# Patient Record
Sex: MALE | Race: WHITE | NOT HISPANIC OR LATINO | Employment: FULL TIME | URBAN - METROPOLITAN AREA
[De-identification: names, ages, dates, MRNs, and addresses within clinical notes are randomized per-mention and may not be internally consistent; named-entity substitution may affect disease eponyms.]

---

## 2019-12-21 ENCOUNTER — OFFICE VISIT (OUTPATIENT)
Dept: URGENT CARE | Facility: CLINIC | Age: 39
End: 2019-12-21
Payer: COMMERCIAL

## 2019-12-21 VITALS
SYSTOLIC BLOOD PRESSURE: 110 MMHG | WEIGHT: 310 LBS | HEART RATE: 80 BPM | BODY MASS INDEX: 41.08 KG/M2 | OXYGEN SATURATION: 98 % | TEMPERATURE: 98.7 F | HEIGHT: 73 IN | DIASTOLIC BLOOD PRESSURE: 80 MMHG | RESPIRATION RATE: 18 BRPM

## 2019-12-21 DIAGNOSIS — J02.9 SORE THROAT: Primary | ICD-10-CM

## 2019-12-21 LAB — S PYO AG THROAT QL: NEGATIVE

## 2019-12-21 PROCEDURE — 87880 STREP A ASSAY W/OPTIC: CPT | Performed by: PHYSICIAN ASSISTANT

## 2019-12-21 PROCEDURE — 87070 CULTURE OTHR SPECIMN AEROBIC: CPT | Performed by: PHYSICIAN ASSISTANT

## 2019-12-21 PROCEDURE — 99203 OFFICE O/P NEW LOW 30 MIN: CPT | Performed by: PHYSICIAN ASSISTANT

## 2019-12-21 NOTE — PATIENT INSTRUCTIONS
1  Sore throat  -Rapid strep test was negative and throat culture is pending-Call in 2 days for results: [phone]  -Use tylenol/motrin as directed  -Salt H20 gargles/throat lozenges  -Increase fluids  -Follow-up with PCP within 5-7 days    Go to ER with worsening symptoms, worsening pain, high fever, difficulty swallowing, or any signs of distress

## 2019-12-21 NOTE — PROGRESS NOTES
[facility]        NAME: Benjamin Rocha is a 44 y o  male  : 1980    MRN: [id number]  DATE: 2019  TIME: 2:07 PM    Assessment and Plan   Sore throat [J02 9]  1  Sore throat  POCT rapid strepA    Throat culture         Patient Instructions     Patient Instructions   1  Sore throat  -Rapid strep test was negative and throat culture is pending-Call in 2 days for results: [phone]  -Use tylenol/motrin as directed  -Salt H20 gargles/throat lozenges  -Increase fluids  -Follow-up with PCP within 5-7 days    Go to ER with worsening symptoms, worsening pain, high fever, difficulty swallowing, or any signs of distress     Follow up with PCP in 3-5 days  Proceed to  ER if symptoms worsen  Chief Complaint     Chief Complaint   Patient presents with    Sore Throat     x 4 days  History of Present Illness       Patient is a 70-year-old male who presents today for sore throat for the past 4 days  He rates his pain as a 6/10  He admits to intermittent headaches  He states that his girlfriend is sick with similar symptoms  No fever  Review of Systems   Review of Systems   Constitutional: Negative for chills and fever  HENT: Positive for sore throat  Negative for ear pain and rhinorrhea  Respiratory: Negative for shortness of breath  Cardiovascular: Negative for chest pain  Musculoskeletal: Negative for arthralgias  Neurological: Negative for headaches  All other systems reviewed and are negative  Current Medications     No current outpatient medications on file  Current Allergies     Allergies as of 2019 - Reviewed 2019   Allergen Reaction Noted    Codeine Swelling 2019            The following portions of the patient's history were reviewed and updated as appropriate: allergies, current medications, past family history, past medical history, past social history, past surgical history and problem list      History reviewed   No pertinent past medical history  History reviewed  No pertinent surgical history  History reviewed  No pertinent family history  Medications have been verified  Objective   /80 (BP Location: Left arm, Patient Position: Sitting)   Pulse 80   Temp 98 7 °F (37 1 °C) (Temporal)   Resp 18   Ht 6' 1" (1 854 m)   Wt (!) 141 kg (310 lb)   SpO2 98%   BMI 40 90 kg/m²        Physical Exam     Physical Exam   Constitutional: He is oriented to person, place, and time  He appears well-developed and well-nourished  No distress  HENT:   Head: Normocephalic and atraumatic  Right Ear: Tympanic membrane, external ear and ear canal normal    Left Ear: Tympanic membrane, external ear and ear canal normal    Nose: Nose normal  Right sinus exhibits no maxillary sinus tenderness and no frontal sinus tenderness  Left sinus exhibits no maxillary sinus tenderness and no frontal sinus tenderness  Mouth/Throat: Uvula is midline and mucous membranes are normal  Posterior oropharyngeal erythema present  Tonsils are 0 on the right  Tonsils are 0 on the left  No tonsillar exudate  Eyes: Pupils are equal, round, and reactive to light  Conjunctivae and EOM are normal    Neck: Normal range of motion  Neck supple  Cardiovascular: Normal rate, regular rhythm and normal heart sounds  Pulmonary/Chest: Effort normal and breath sounds normal    Lymphadenopathy:     He has no cervical adenopathy  Neurological: He is alert and oriented to person, place, and time  Skin: Skin is warm and dry  Psychiatric: He has a normal mood and affect  Nursing note and vitals reviewed

## 2019-12-23 LAB — BACTERIA THROAT CULT: NORMAL

## 2024-11-03 ENCOUNTER — OFFICE VISIT (OUTPATIENT)
Dept: URGENT CARE | Facility: CLINIC | Age: 44
End: 2024-11-03
Payer: COMMERCIAL

## 2024-11-03 VITALS
SYSTOLIC BLOOD PRESSURE: 134 MMHG | WEIGHT: 310 LBS | TEMPERATURE: 96.6 F | OXYGEN SATURATION: 96 % | HEART RATE: 70 BPM | DIASTOLIC BLOOD PRESSURE: 80 MMHG | BODY MASS INDEX: 40.9 KG/M2

## 2024-11-03 DIAGNOSIS — R22.1 SWOLLEN UVULA: ICD-10-CM

## 2024-11-03 DIAGNOSIS — J02.0 STREP PHARYNGITIS: Primary | ICD-10-CM

## 2024-11-03 LAB — S PYO AG THROAT QL: POSITIVE

## 2024-11-03 PROCEDURE — G0382 LEV 3 HOSP TYPE B ED VISIT: HCPCS

## 2024-11-03 PROCEDURE — S9083 URGENT CARE CENTER GLOBAL: HCPCS

## 2024-11-03 PROCEDURE — 87880 STREP A ASSAY W/OPTIC: CPT

## 2024-11-03 RX ORDER — AMOXICILLIN 500 MG/1
500 CAPSULE ORAL 2 TIMES DAILY
Qty: 20 CAPSULE | Refills: 0 | Status: SHIPPED | OUTPATIENT
Start: 2024-11-03 | End: 2024-11-13

## 2024-11-03 RX ORDER — PREDNISONE 20 MG/1
40 TABLET ORAL DAILY
Qty: 10 TABLET | Refills: 0 | Status: SHIPPED | OUTPATIENT
Start: 2024-11-03 | End: 2024-11-08

## 2024-11-03 NOTE — PROGRESS NOTES
[facility]        NAME: Jaison Xiong is a 43 y.o. male  : 1980    MRN: [id number]  DATE: November 3, 2024  TIME: 9:16 AM    Assessment and Plan   Strep pharyngitis [J02.0]  1. Strep pharyngitis  POCT rapid ANTIGEN strepA    amoxicillin (AMOXIL) 500 mg capsule      2. Swollen uvula  POCT rapid ANTIGEN strepA    predniSONE 20 mg tablet        Rapid strep positive in office, will treat. Prednisone given due to uvula swelling and discomfort.     Patient Instructions     Take antibiotics as prescribed  Replace toothbrush after 2-3 days of treatment  Fluids and rest  Salt water gargles and chloraseptic spray  Warm tea with honey  Wash hands frequently  Don't share drinks  Tylenol/Ibuprofen for pain/fever    Follow up with PCP in 3-5 days.  Proceed to the ER with worsening symptoms.       If tests are performed, our office will contact you with results only if changes need to made to the care plan discussed with you at the visit. [facility]    Chief Complaint     Chief Complaint   Patient presents with    Sore Throat     Pt is here for sore throat and swollen uvula since Friday          History of Present Illness       Sore Throat   This is a new problem. The current episode started in the past 7 days. The problem has been unchanged. Neither side of throat is experiencing more pain than the other. There has been no fever. Associated symptoms include swollen glands. Pertinent negatives include no abdominal pain, congestion, coughing, headaches, shortness of breath, trouble swallowing or vomiting. Treatments tried: Advil sinus. The treatment provided mild relief.       Review of Systems   Review of Systems   Constitutional:  Negative for chills and fever.   HENT:  Positive for sore throat. Negative for congestion, postnasal drip, rhinorrhea, sinus pressure, trouble swallowing and voice change.    Respiratory:  Negative for cough, chest tightness and  shortness of breath.    Cardiovascular:  Negative for chest pain and palpitations.   Gastrointestinal:  Negative for abdominal pain, nausea and vomiting.   Genitourinary:  Negative for difficulty urinating.   Musculoskeletal:  Negative for myalgias.   Neurological:  Negative for dizziness and headaches.         Current Medications       Current Outpatient Medications:     amoxicillin (AMOXIL) 500 mg capsule, Take 1 capsule (500 mg total) by mouth 2 (two) times a day for 10 days, Disp: 20 capsule, Rfl: 0    predniSONE 20 mg tablet, Take 2 tablets (40 mg total) by mouth daily for 5 days, Disp: 10 tablet, Rfl: 0    Current Allergies     Allergies as of 11/03/2024 - Reviewed 11/03/2024   Allergen Reaction Noted    Codeine Swelling 12/21/2019            The following portions of the patient's history were reviewed and updated as appropriate: allergies, current medications, past family history, past medical history, past social history, past surgical history and problem list.     History reviewed. No pertinent past medical history.    History reviewed. No pertinent surgical history.    History reviewed. No pertinent family history.      Medications have been verified.        Objective   /80   Pulse 70   Temp (!) 96.6 °F (35.9 °C)   Wt (!) 141 kg (310 lb)   SpO2 96%   BMI 40.90 kg/m²        Physical Exam     Physical Exam  Constitutional:       General: He is not in acute distress.     Appearance: He is not ill-appearing.   HENT:      Nose: Nose normal.      Mouth/Throat:      Mouth: Mucous membranes are moist.      Pharynx: Oropharynx is clear. Uvula midline. Posterior oropharyngeal erythema and uvula swelling (mild) present.      Tonsils: Tonsillar exudate present. 1+ on the right. 1+ on the left.   Eyes:      Pupils: Pupils are equal, round, and reactive to light.   Cardiovascular:      Rate and Rhythm: Normal rate and regular rhythm.      Pulses: Normal pulses.      Heart sounds: Normal heart sounds. No  murmur heard.     No gallop.   Pulmonary:      Effort: Pulmonary effort is normal. No respiratory distress.      Breath sounds: Normal breath sounds. No wheezing.   Abdominal:      General: Abdomen is flat. Bowel sounds are normal. There is no distension.      Palpations: Abdomen is soft.      Tenderness: There is no abdominal tenderness.   Musculoskeletal:         General: Normal range of motion.      Cervical back: Normal range of motion.   Lymphadenopathy:      Cervical: No cervical adenopathy.   Skin:     General: Skin is warm and dry.      Capillary Refill: Capillary refill takes less than 2 seconds.   Neurological:      Mental Status: He is alert and oriented to person, place, and time.